# Patient Record
Sex: FEMALE | Race: WHITE
[De-identification: names, ages, dates, MRNs, and addresses within clinical notes are randomized per-mention and may not be internally consistent; named-entity substitution may affect disease eponyms.]

---

## 2018-11-20 VITALS — DIASTOLIC BLOOD PRESSURE: 74 MMHG | SYSTOLIC BLOOD PRESSURE: 134 MMHG

## 2019-02-04 ENCOUNTER — HOSPITAL ENCOUNTER (OUTPATIENT)
Dept: HOSPITAL 61 - KCIC DEXA | Age: 69
Discharge: HOME | End: 2019-02-04
Attending: FAMILY MEDICINE
Payer: COMMERCIAL

## 2019-02-04 DIAGNOSIS — Z13.820: ICD-10-CM

## 2019-02-04 DIAGNOSIS — Z78.0: ICD-10-CM

## 2019-02-04 DIAGNOSIS — E07.89: ICD-10-CM

## 2019-02-04 DIAGNOSIS — Z12.31: Primary | ICD-10-CM

## 2019-02-04 PROCEDURE — 77067 SCR MAMMO BI INCL CAD: CPT

## 2019-02-04 PROCEDURE — 77080 DXA BONE DENSITY AXIAL: CPT

## 2019-02-04 PROCEDURE — 77081 DXA BONE DENSITY APPENDICULR: CPT

## 2019-02-04 NOTE — [facility]
Indication: Postmenopausal. Osteoporosis screening.

 

TECHNIQUE: DEXA scan

 

COMPARISON: None

 

FINDINGS:

The bone mineral density from L1-L4 measures 1.299 g/sq cm with T score of

2.3.

The bone mineral density in the distal radius and ulna measures 0.560 g/sq

cm with T score of -0.1.

 

IMPRESSION:

1. Normal bone mineral density in the lumbar spine. Please note that 

degenerative changes may falsely elevate bone mineral density.

2. Normal bone mineral density in the distal forearm.

 

Electronically signed by: Jose Jules DO (2/4/2019 3:45 PM) [id number]
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
## 2019-02-04 NOTE — [facility]
Bilateral digital screening mammograms:

 

Reason for examination: Routine screening.

 

Comparison is made to previous studies dated 11/16/2017 and 11/20/2013.

 

Interpretation was made with the benefit of CAD.

 

The skin and nipples show no abnormalities. No abnormal axillary lymph 

nodes are seen. The breast parenchyma shows scattered fibroglandular 

density. (Breast density: Category B.) There are no dominant masses, 

suspicious calcifications or architectural distortions.

 

Impression:

 

No evidence of malignancy. Recommend routine screening.

 

BI-RADS Category 1:  Negative.

 

"Our facility is accredited by the [employer] 

Mammography Program."

 

This patient's information has been entered into a reminder system for the

patient to be notified with the results of her examination and a target 

date for the next mammogram.

 

Electronically signed by: Sunshine Meadows MD (2/4/2019 1:37 PM) [id number]

## 2019-07-26 ENCOUNTER — HOSPITAL ENCOUNTER (EMERGENCY)
Dept: HOSPITAL 61 - ER | Age: 69
Discharge: HOME | End: 2019-07-26
Payer: MEDICAID

## 2019-07-26 VITALS — DIASTOLIC BLOOD PRESSURE: 83 MMHG | SYSTOLIC BLOOD PRESSURE: 179 MMHG

## 2019-07-26 VITALS — WEIGHT: 223 LBS | HEIGHT: 60 IN | BODY MASS INDEX: 43.78 KG/M2

## 2019-07-26 DIAGNOSIS — E03.9: ICD-10-CM

## 2019-07-26 DIAGNOSIS — M25.521: ICD-10-CM

## 2019-07-26 DIAGNOSIS — Z88.0: ICD-10-CM

## 2019-07-26 DIAGNOSIS — S46.812A: Primary | ICD-10-CM
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
## 2019-07-26 DIAGNOSIS — Z88.5: ICD-10-CM
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
## 2019-07-26 DIAGNOSIS — S16.1XXA: ICD-10-CM
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
## 2019-07-26 PROCEDURE — 73080 X-RAY EXAM OF ELBOW: CPT

## 2019-07-26 PROCEDURE — 99284 EMERGENCY DEPT VISIT MOD MDM: CPT
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
## 2020-09-18 ENCOUNTER — HOSPITAL ENCOUNTER (OUTPATIENT)
Dept: HOSPITAL 63 - MAMMO | Age: 70
End: 2020-09-18
Attending: NURSE PRACTITIONER
Payer: COMMERCIAL

## 2020-09-18 DIAGNOSIS — Z12.31: Primary | ICD-10-CM

## 2020-09-18 DIAGNOSIS — N64.89: ICD-10-CM

## 2020-09-18 PROCEDURE — 77067 SCR MAMMO BI INCL CAD: CPT

## 2020-09-18 PROCEDURE — 77063 BREAST TOMOSYNTHESIS BI: CPT

## 2020-09-23 NOTE — RAD
BILATERAL SCREENING MAMMOGRAM, 3-D

 

History: Routine screening.

 

Comparison:  10/21/2010, 11/20/2013, 2/4/2019. 

 

Technique:  MLO and CC digital tomosynthesis (3D) images obtained. 

Radiologist reviewed these images on dedicated workstation.

 

Findings: 

Breast Tissue Density B : There are scattered areas of fibroglandular 

density.

 

There are no dominant masses, suspicious microcalcifications, or 

architectural distortion.  Small asymmetry involving the right lower outer

breast is present proximally 5.5 cm from the nipple. There is a small 

asymmetry at the far posterior central outer right MLO view measuring 0.6 

cm from the nipple. This measures 0.6 cm.

 

IMPRESSION:

There are 2 asymmetries involving the right breast that are new compared 

to prior exams. They are small in size. Ultrasound examination is 

recommended. Spot compression may be needed..

 

BI-RADS Category 0:  Incomplete:  Need additional imaging evaluation.

 

The images were reviewed with computer-aided detection.

 

Patient information is entered into reminder system with a target due date

for the next screening mammogram.

 

Mammography is the most sensitive method for finding small breast cancers,

but it does not detect them all and is not a substitute for careful 

clinical examination. A negative mammogram does not negate a clinically 

suspicious finding and should not result in delay in biopsying a 

clinically suspicious abnormality.

 

 "Our facility is accredited by the American [employer] 

Mammography Program."

 

Electronically signed by: Jesse Arriaga MD (9/23/2020 5:18 PM) [id number]

## 2020-10-06 ENCOUNTER — HOSPITAL ENCOUNTER (OUTPATIENT)
Dept: HOSPITAL 63 - US | Age: 70
End: 2020-10-06
Attending: NURSE PRACTITIONER
Payer: COMMERCIAL

## 2020-10-06 DIAGNOSIS — N63.10: ICD-10-CM

## 2020-10-06 DIAGNOSIS — R92.2: Primary | ICD-10-CM

## 2020-10-06 PROCEDURE — 77065 DX MAMMO INCL CAD UNI: CPT

## 2020-10-06 PROCEDURE — 76641 ULTRASOUND BREAST COMPLETE: CPT

## 2020-10-06 NOTE — RAD
Examination: DIGITAL DIAGNOSTIC RT, BREAST RIGHT

 

History: Reason: CALLBACK ABNORMAL MAMMOGRAM / Spl. Instructions:  / 

History: 

 

Comparison/Correlation: 11/20/2013, 2/4/2019, 9/18/2020 mammographic exams

 

Findings: Limited right breast ultrasound exam was performed from 6:00 to 

9:00 region. No suspicious finding identified. No mass or cyst. 

Benign-appearing small lymph nodes along the right axilla.

 

Spot compression imaging of the right outer breast in the posterior aspect

of the right upper to mid breast level was performed. Mediolateral view 

was acquired.

 

Scattered fibroglandular densities are present.

 

Asymmetries in the outer aspect of the right breast persists but appear 

stable upon correlation with previous exam of 9/20/2013. The findings the 

posterior aspect of the right breast does not persist on spot compression.

 

 

Impression:

BI-RADS Category 1-negative. Annual screening mammography is recommended.

 

Electronically signed by: Jesse Arriaga MD (10/6/2020 6:29 PM) [id number]